# Patient Record
Sex: FEMALE | Race: WHITE | NOT HISPANIC OR LATINO | Employment: STUDENT | URBAN - METROPOLITAN AREA
[De-identification: names, ages, dates, MRNs, and addresses within clinical notes are randomized per-mention and may not be internally consistent; named-entity substitution may affect disease eponyms.]

---

## 2020-05-04 ENCOUNTER — HOSPITAL ENCOUNTER (EMERGENCY)
Facility: HOSPITAL | Age: 20
Discharge: HOME | End: 2020-05-04
Attending: EMERGENCY MEDICINE
Payer: COMMERCIAL

## 2020-05-04 VITALS
OXYGEN SATURATION: 100 % | HEIGHT: 70 IN | TEMPERATURE: 99 F | WEIGHT: 175 LBS | SYSTOLIC BLOOD PRESSURE: 130 MMHG | DIASTOLIC BLOOD PRESSURE: 86 MMHG | RESPIRATION RATE: 16 BRPM | BODY MASS INDEX: 25.05 KG/M2 | HEART RATE: 79 BPM

## 2020-05-04 DIAGNOSIS — T25.121A SUPERFICIAL BURN OF RIGHT FOOT, INITIAL ENCOUNTER: Primary | ICD-10-CM

## 2020-05-04 PROCEDURE — 99282 EMERGENCY DEPT VISIT SF MDM: CPT

## 2020-05-04 PROCEDURE — 63700000 HC SELF-ADMINISTRABLE DRUG: Performed by: STUDENT IN AN ORGANIZED HEALTH CARE EDUCATION/TRAINING PROGRAM

## 2020-05-04 RX ORDER — SILVER SULFADIAZINE 10 G/1000G
CREAM TOPICAL ONCE
Status: COMPLETED | OUTPATIENT
Start: 2020-05-04 | End: 2020-05-04

## 2020-05-04 RX ADMIN — SILVER SULFADIAZINE: 10 CREAM TOPICAL at 12:00

## 2020-05-04 SDOH — HEALTH STABILITY: MENTAL HEALTH: HOW OFTEN DO YOU HAVE A DRINK CONTAINING ALCOHOL?: NEVER

## 2020-05-04 ASSESSMENT — ENCOUNTER SYMPTOMS
ARTHRALGIAS: 0
SORE THROAT: 0
DYSURIA: 0
COUGH: 0
CHILLS: 0
PALPITATIONS: 0
SHORTNESS OF BREATH: 0
BACK PAIN: 0
WOUND: 1
EYE PAIN: 0
VOMITING: 0
COLOR CHANGE: 0
FEVER: 0
HEMATURIA: 0
ABDOMINAL PAIN: 0
SEIZURES: 0

## 2020-05-04 NOTE — ED ATTESTATION NOTE
Procedures  Physical Exam  Review of Systems    5/4/202011:38 AM  I have personally seen and examined the patient. I have reviewed and agree with the PA/NP/Resident's assessment and ED plan of care. My examination, assessment and plan of care of Debbie Galicia is as follows:    Patient is a 19-year-old female who presents after sustaining a burn to her right foot, patient reports she dropped hot oatmeal onto her exposed right foot, no other injuries reported    Exam:   Heart: RRR, normal S1/S2  Lungs: CTA bilaterally  Abdo: soft, non-tender    Plan: Patient is a 19-year-old female who presents after sustaining a superficial burn to her right foot, will treat with topical ointments and follow-up as an outpatient          Godshall, Duane K, MD  05/04/20 5177

## 2020-05-04 NOTE — ED PROVIDER NOTES
"HPI     Chief Complaint   Patient presents with   • Foot Burn       HPI   20yo female no PMHx presenting with burn    Dropped bowl of hot oatmeal on foot this morning  Washed with cold water  A blister popped up and has gotten bigger  No burn to other parts of body  No falls, no blunt trauma to foot  Denies numbness, tingling or weakness of foot     Patient History     History reviewed. No pertinent past medical history.    History reviewed. No pertinent surgical history.    History reviewed. No pertinent family history.    Social History     Tobacco Use   • Smoking status: Never Smoker   • Smokeless tobacco: Never Used   Substance Use Topics   • Alcohol use: Never     Frequency: Never   • Drug use: Never       Systems Reviewed from Nursing Triage:          Review of Systems     Review of Systems   Constitutional: Negative for chills and fever.   HENT: Negative for ear pain and sore throat.    Eyes: Negative for pain and visual disturbance.   Respiratory: Negative for cough and shortness of breath.    Cardiovascular: Negative for chest pain and palpitations.   Gastrointestinal: Negative for abdominal pain and vomiting.   Genitourinary: Negative for dysuria and hematuria.   Musculoskeletal: Negative for arthralgias and back pain.   Skin: Positive for wound. Negative for color change and rash.   Neurological: Negative for seizures and syncope.   All other systems reviewed and are negative.       Physical Exam     ED Triage Vitals [05/04/20 1107]   Temp Heart Rate Resp BP SpO2   37.2 °C (99 °F) 79 16 130/86 100 %      Temp Source Heart Rate Source Patient Position BP Location FiO2 (%) (Set)   Temporal -- Sitting Left upper arm --                     Patient Vitals for the past 24 hrs:   BP Temp Temp src Pulse Resp SpO2 Height Weight   05/04/20 1107 130/86 37.2 °C (99 °F) Temporal 79 16 100 % 1.778 m (5' 10\") 79.4 kg (175 lb)                                          Physical Exam   Constitutional: She is oriented to " person, place, and time. She appears well-developed and well-nourished. No distress.   HENT:   Head: Normocephalic and atraumatic.   Eyes: Pupils are equal, round, and reactive to light. Conjunctivae are normal.   Neck: Normal range of motion.   Cardiovascular: Normal rate, regular rhythm and intact distal pulses.   Pulmonary/Chest: Effort normal. No respiratory distress.   Abdominal: She exhibits no distension.   Musculoskeletal: Normal range of motion. She exhibits no edema or deformity.   Neurological: She is alert and oriented to person, place, and time. No sensory deficit.   Skin: Skin is warm and dry. Capillary refill takes less than 2 seconds.   Partial thickness burn to dorsal right foot  Multiple bullae, largest approx 3x3cm, soft with serous fluid  Full ROM, sensation throughout foot, good pulses and cap refill   Psychiatric: She has a normal mood and affect.   Nursing note and vitals reviewed.           Procedures    ED Course & MDM     Labs Reviewed - No data to display    No orders to display               MDM  20yo female no PMHx presenting with burn to dorsal right foot. Partial thickness with intact bullae. Full ROM and strength of ankle, toes; do not suspect deep tissue injury.    Plan:  - Silvadene for pain control  - Counseled on supportive care measures, wound care, follow up instructions, and return precautions.       ED Course as of May 04 2103   Mon May 04, 2020   1208 Put silvadene on burn. Discharged by me, paperwork signed. No IV in place.    [MP]      ED Course User Index  [MP] Julia Quinn MD         Clinical Impressions as of May 04 2103   Superficial burn of right foot, initial encounter        Julia Quinn MD  Resident  05/04/20 2103

## 2022-11-01 PROBLEM — Z00.00 ENCOUNTER FOR PREVENTIVE HEALTH EXAMINATION: Status: ACTIVE | Noted: 2022-11-01

## 2022-11-02 ENCOUNTER — APPOINTMENT (OUTPATIENT)
Dept: OBGYN | Facility: CLINIC | Age: 22
End: 2022-11-02

## 2022-11-02 ENCOUNTER — LABORATORY RESULT (OUTPATIENT)
Age: 22
End: 2022-11-02

## 2022-11-02 ENCOUNTER — NON-APPOINTMENT (OUTPATIENT)
Age: 22
End: 2022-11-02

## 2022-11-02 VITALS
HEART RATE: 66 BPM | HEIGHT: 70 IN | OXYGEN SATURATION: 99 % | BODY MASS INDEX: 27.06 KG/M2 | WEIGHT: 189 LBS | SYSTOLIC BLOOD PRESSURE: 131 MMHG | DIASTOLIC BLOOD PRESSURE: 78 MMHG

## 2022-11-02 DIAGNOSIS — Z87.2 PERSONAL HISTORY OF DISEASES OF THE SKIN AND SUBCUTANEOUS TISSUE: ICD-10-CM

## 2022-11-02 DIAGNOSIS — Z83.3 FAMILY HISTORY OF DIABETES MELLITUS: ICD-10-CM

## 2022-11-02 DIAGNOSIS — Z78.9 OTHER SPECIFIED HEALTH STATUS: ICD-10-CM

## 2022-11-02 PROCEDURE — 99395 PREV VISIT EST AGE 18-39: CPT

## 2022-11-02 PROCEDURE — 36415 COLL VENOUS BLD VENIPUNCTURE: CPT

## 2022-11-06 ENCOUNTER — TRANSCRIPTION ENCOUNTER (OUTPATIENT)
Age: 22
End: 2022-11-06

## 2022-11-06 PROBLEM — Z87.2 HISTORY OF ACNE: Status: RESOLVED | Noted: 2022-11-06 | Resolved: 2022-11-06

## 2022-11-06 PROBLEM — Z78.9 CONSUMES ALCOHOL OCCASIONALLY: Status: ACTIVE | Noted: 2022-11-06

## 2022-11-06 PROBLEM — Z83.3 FAMILY HISTORY OF TYPE 2 DIABETES MELLITUS: Status: ACTIVE | Noted: 2022-11-06

## 2022-11-06 NOTE — COUNSELING
[Nutrition/ Exercise/ Weight Management] : nutrition, exercise, weight management [Body Image] : body image [Vitamins/Supplements] : vitamins/supplements [Contraception/ Emergency Contraception/ Safe Sexual Practices] : contraception, emergency contraception, safe sexual practices [Preconception Care/ Fertility options] : preconception care, fertility options [Confidentiality] : confidentiality [STD (testing, results, tx)] : STD (testing, results, tx) [Vaccines] : vaccines [HPV Vaccine] : HPV Vaccine

## 2022-11-10 NOTE — PLAN
[FreeTextEntry1] : 22 y/o G0 here for WWE\par #WWE\par - Pap smear and reflex to HPV done\par - GC/CT cx done via pap\par - STD blood work panel done\par - Declines hormonal contraception\par \par #Irregular cycles \par - PCOS blood panel done\par - referral for pelvic and TVUS done \par \par

## 2022-11-10 NOTE — HISTORY OF PRESENT ILLNESS
[Y] : Patient is sexually active [N] : Patient denies prior pregnancies [Irregular Menstrual Interval] : irregular menstrual interval [Currently Active] : currently active [Men] : men [No] : No [Patient would like to be screened for STIs] : Patient would like to be screened for STIs [LMPDate] : 10/26/22 [MensesFreq] : 28 [MensesLength] : 7 [TextBox_6] : 10/26/22 [TextBox_9] : 12 [FreeTextEntry1] : 10/26/22

## 2022-12-02 LAB
17OHP SERPL-MCNC: 56 NG/DL
25(OH)D3 SERPL-MCNC: 27.5 NG/ML
ALBUMIN SERPL ELPH-MCNC: 5 G/DL
ALP BLD-CCNC: 80 U/L
ALT SERPL-CCNC: 14 U/L
ANDROST SERPL-MCNC: 249 NG/DL
ANION GAP SERPL CALC-SCNC: 12 MMOL/L
AST SERPL-CCNC: 20 U/L
BASOPHILS # BLD AUTO: 0.05 K/UL
BASOPHILS NFR BLD AUTO: 0.7 %
BILIRUB SERPL-MCNC: 0.4 MG/DL
BUN SERPL-MCNC: 9 MG/DL
C TRACH RRNA SPEC QL NAA+PROBE: NOT DETECTED
CALCIUM SERPL-MCNC: 10.3 MG/DL
CHLORIDE SERPL-SCNC: 102 MMOL/L
CHOLEST SERPL-MCNC: 150 MG/DL
CO2 SERPL-SCNC: 26 MMOL/L
CREAT SERPL-MCNC: 0.72 MG/DL
CYTOLOGY CVX/VAG DOC THIN PREP: NORMAL
DHEA-S SERPL-MCNC: 397 UG/DL
DHEA-SULFATE, SERUM: 237 UG/DL
EGFR: 122 ML/MIN/1.73M2
EOSINOPHIL # BLD AUTO: 0.04 K/UL
EOSINOPHIL NFR BLD AUTO: 0.5 %
ESTIMATED AVERAGE GLUCOSE: 108 MG/DL
ESTROGEN SERPL-MCNC: 87 PG/ML
FSH SERPL-MCNC: 4.3 IU/L
GLUCOSE SERPL-MCNC: 84 MG/DL
HBA1C MFR BLD HPLC: 5.4 %
HBV SURFACE AG SER QL: NONREACTIVE
HCT VFR BLD CALC: 40.2 %
HCV AB SER QL: NONREACTIVE
HCV S/CO RATIO: 0.18 S/CO
HDLC SERPL-MCNC: 60 MG/DL
HGB BLD-MCNC: 12.7 G/DL
HIV1+2 AB SPEC QL IA.RAPID: NONREACTIVE
IGF-1 INTERP: NORMAL
IGF-I BLD-MCNC: 245 NG/ML
IMM GRANULOCYTES NFR BLD AUTO: 0.3 %
IRON SATN MFR SERPL: 28 %
IRON SERPL-MCNC: 119 UG/DL
LDLC SERPL CALC-MCNC: 70 MG/DL
LH SERPL-ACNC: 8.6 IU/L
LYMPHOCYTES # BLD AUTO: 1.26 K/UL
LYMPHOCYTES NFR BLD AUTO: 16.7 %
MAN DIFF?: NORMAL
MCHC RBC-ENTMCNC: 29.2 PG
MCHC RBC-ENTMCNC: 31.6 GM/DL
MCV RBC AUTO: 92.4 FL
MONOCYTES # BLD AUTO: 0.41 K/UL
MONOCYTES NFR BLD AUTO: 5.4 %
N GONORRHOEA RRNA SPEC QL NAA+PROBE: NOT DETECTED
NEUTROPHILS # BLD AUTO: 5.78 K/UL
NEUTROPHILS NFR BLD AUTO: 76.4 %
NONHDLC SERPL-MCNC: 90 MG/DL
PLATELET # BLD AUTO: 296 K/UL
POTASSIUM SERPL-SCNC: 4.3 MMOL/L
PROGEST SERPL-MCNC: 0.3 NG/ML
PROLACTIN SERPL-MCNC: 13.6 NG/ML
PROT SERPL-MCNC: 7.8 G/DL
RBC # BLD: 4.35 M/UL
RBC # FLD: 13.3 %
SODIUM SERPL-SCNC: 139 MMOL/L
SOURCE TP AMPLIFICATION: NORMAL
T PALLIDUM AB SER QL IA: NEGATIVE
TIBC SERPL-MCNC: 421 UG/DL
TRIGL SERPL-MCNC: 101 MG/DL
TSH SERPL-ACNC: 0.86 UIU/ML
UIBC SERPL-MCNC: 302 UG/DL
VIT B12 SERPL-MCNC: 333 PG/ML
WBC # FLD AUTO: 7.56 K/UL

## 2023-05-02 ENCOUNTER — APPOINTMENT (OUTPATIENT)
Dept: OBGYN | Facility: CLINIC | Age: 23
End: 2023-05-02
Payer: COMMERCIAL

## 2023-05-02 VITALS
DIASTOLIC BLOOD PRESSURE: 75 MMHG | WEIGHT: 185 LBS | OXYGEN SATURATION: 99 % | HEART RATE: 61 BPM | SYSTOLIC BLOOD PRESSURE: 125 MMHG

## 2023-05-02 DIAGNOSIS — N92.3 OVULATION BLEEDING: ICD-10-CM

## 2023-05-02 DIAGNOSIS — N92.6 IRREGULAR MENSTRUATION, UNSPECIFIED: ICD-10-CM

## 2023-05-02 DIAGNOSIS — B37.9 CANDIDIASIS, UNSPECIFIED: ICD-10-CM

## 2023-05-02 DIAGNOSIS — N89.8 OTHER SPECIFIED NONINFLAMMATORY DISORDERS OF VAGINA: ICD-10-CM

## 2023-05-02 PROCEDURE — 87102 FUNGUS ISOLATION CULTURE: CPT

## 2023-05-02 PROCEDURE — 99214 OFFICE O/P EST MOD 30 MIN: CPT | Mod: 25

## 2023-05-09 DIAGNOSIS — A49.3 NONSPECIFIC URETHRITIS: ICD-10-CM

## 2023-05-09 DIAGNOSIS — Z30.019 ENCOUNTER FOR INITIAL PRESCRIPTION OF CONTRACEPTIVES, UNSPECIFIED: ICD-10-CM

## 2023-05-09 DIAGNOSIS — N34.1 NONSPECIFIC URETHRITIS: ICD-10-CM

## 2023-05-09 RX ORDER — AZITHROMYCIN 500 MG/1
500 TABLET, FILM COATED ORAL DAILY
Qty: 7 | Refills: 0 | Status: ACTIVE | COMMUNITY
Start: 2023-05-09 | End: 1900-01-01

## 2023-05-11 ENCOUNTER — NON-APPOINTMENT (OUTPATIENT)
Age: 23
End: 2023-05-11

## 2023-05-11 LAB
A VAGINAE DNA VAG QL NAA+PROBE: NORMAL
BVAB2 DNA VAG QL NAA+PROBE: NORMAL
C KRUSEI DNA VAG QL NAA+PROBE: NEGATIVE
C TRACH RRNA SPEC QL NAA+PROBE: NEGATIVE
MEGA1 DNA VAG QL NAA+PROBE: NORMAL
MYCOPLASMA HOMINIS CULTURE: NEGATIVE
N GONORRHOEA RRNA SPEC QL NAA+PROBE: NEGATIVE
T VAGINALIS RRNA SPEC QL NAA+PROBE: NEGATIVE
UREAPLASMA CULTURE: POSITIVE

## 2023-06-04 NOTE — HISTORY OF PRESENT ILLNESS
[Normal Amount/Duration] :  normal amount and duration [Currently Active] : currently active [Men] : men [No] : No [Yes] : Yes [Condoms] : Condoms [FreeTextEntry1] : 04/17/2023

## 2023-06-04 NOTE — PLAN
[FreeTextEntry1] : \par \par \par \par Here for c/o recurrent vaginal discharge, irregular menstrual bleeding/ intermenstrual spotting, wants to discuss birth control options.\par # Vaginal discharge - vaginal and vulvar hygiene discussed, recommended no douching, fragrance free products\par Vaginitis plus candida swab collected\par Ureaplasma and mycoplasma cultures done\par Recommended not treating unless we can confirm what the etiology is\par # Irregular menstrual bleeding, intermenstrual spotting\par - Wanted to discuss options for birth control\par - R/B/A of all methods discussed, no contraindications to any method, pt wants low-dose cOCP - rx junel Fe sent to pharmacy.  Discussed back up birth control and how to start pack.\par - Recommended pelvic and TVUS at [facility] to evaluate for any structural abnormalities that may cause abnormal bleeding \par Further management and plan of care pending results of tests\par \par I spent a total of 30 minutes on the date of the encounter in assessing, evaluating, and treating the patient.  Education provided, questions answered.  Plan of care thoroughly discussed.

## 2023-06-04 NOTE — PHYSICAL EXAM
[Chaperone Present] : A chaperone was present in the examining room during all aspects of the physical examination [Appropriately responsive] : appropriately responsive [Alert] : alert [No Acute Distress] : no acute distress [Soft] : soft [Non-tender] : non-tender [Non-distended] : non-distended [No HSM] : No HSM [No Lesions] : no lesions [No Mass] : no mass [Oriented x3] : oriented x3 [Labia Majora] : normal [Labia Minora] : normal [Normal] : normal [Uterine Adnexae] : normal [FreeTextEntry5] : No increased work of breathing

## 2023-06-04 NOTE — COUNSELING
[Vitamins/Supplements] : vitamins/supplements [Bladder Hygiene] : bladder hygiene [Contraception/ Emergency Contraception/ Safe Sexual Practices] : contraception, emergency contraception, safe sexual practices [STD (testing, results, tx)] : STD (testing, results, tx) [Medication Management] : medication management

## 2023-06-05 RX ORDER — NORETHINDRONE ACETATE AND ETHINYL ESTRADIOL 1; 20 MG/1; UG/1
1-20 TABLET ORAL DAILY
Qty: 3 | Refills: 3 | Status: ACTIVE | COMMUNITY
Start: 2023-06-05 | End: 1900-01-01

## 2024-04-02 ENCOUNTER — APPOINTMENT (OUTPATIENT)
Dept: OBGYN | Facility: CLINIC | Age: 24
End: 2024-04-02
Payer: COMMERCIAL

## 2024-04-02 VITALS
OXYGEN SATURATION: 98 % | DIASTOLIC BLOOD PRESSURE: 79 MMHG | HEART RATE: 104 BPM | SYSTOLIC BLOOD PRESSURE: 154 MMHG | WEIGHT: 196 LBS

## 2024-04-02 DIAGNOSIS — Z12.39 ENCOUNTER FOR OTHER SCREENING FOR MALIGNANT NEOPLASM OF BREAST: ICD-10-CM

## 2024-04-02 DIAGNOSIS — Z01.419 ENCOUNTER FOR GYNECOLOGICAL EXAMINATION (GENERAL) (ROUTINE) W/OUT ABNORMAL FINDINGS: ICD-10-CM

## 2024-04-02 DIAGNOSIS — Z11.3 ENCOUNTER FOR SCREENING FOR INFECTIONS WITH A PREDOMINANTLY SEXUAL MODE OF TRANSMISSION: ICD-10-CM

## 2024-04-02 DIAGNOSIS — Z12.4 ENCOUNTER FOR SCREENING FOR MALIGNANT NEOPLASM OF CERVIX: ICD-10-CM

## 2024-04-02 PROCEDURE — 99395 PREV VISIT EST AGE 18-39: CPT

## 2024-04-02 RX ORDER — NORETHINDRONE ACETATE AND ETHINYL ESTRADIOL 1; 20 MG/1; UG/1
1-20 TABLET ORAL
Qty: 1 | Refills: 11 | Status: ACTIVE | COMMUNITY
Start: 2023-05-09 | End: 1900-01-01

## 2024-04-15 LAB
C TRACH RRNA SPEC QL NAA+PROBE: NOT DETECTED
CYTOLOGY CVX/VAG DOC THIN PREP: NORMAL
N GONORRHOEA RRNA SPEC QL NAA+PROBE: NOT DETECTED
SOURCE TP AMPLIFICATION: NORMAL

## 2024-06-02 NOTE — PHYSICAL EXAM
[Appropriately responsive] : appropriately responsive [Alert] : alert [No Acute Distress] : no acute distress [No Lymphadenopathy] : no lymphadenopathy [Soft] : soft [Non-distended] : non-distended [Non-tender] : non-tender [No HSM] : No HSM [No Lesions] : no lesions [No Mass] : no mass [Oriented x3] : oriented x3 [FreeTextEntry5] : No increased work of breathing  [Examination Of The Breasts] : a normal appearance [No Masses] : no breast masses were palpable [Labia Majora] : normal [Labia Minora] : normal [Normal] : normal [Uterine Adnexae] : normal

## 2024-06-02 NOTE — PLAN
[FreeTextEntry1] : Here for annual exam. No complaints or concerns. Not having issue with vaginal discharge anymore. Taking Junel for contraception.  R/B/A reviewed.  No contraindications - will continue.  Refills sent to pharmacy. Pap smear reflex to HPV done GC/CT cxs via pap  Declined STD blood work. Age-related screening tests and timing of tests discussed. Histories reviewed - no changes or updates. Return1 year or PRN.

## 2024-06-02 NOTE — HISTORY OF PRESENT ILLNESS
[FreeTextEntry1] : Kaylene Londono presents for annual exam. No complaints or concerns today. Taking Junel 1/20 for contraception, likes and will continue.  Needs refills.